# Patient Record
Sex: MALE | Race: WHITE | NOT HISPANIC OR LATINO | Employment: FULL TIME | URBAN - METROPOLITAN AREA
[De-identification: names, ages, dates, MRNs, and addresses within clinical notes are randomized per-mention and may not be internally consistent; named-entity substitution may affect disease eponyms.]

---

## 2020-08-24 ENCOUNTER — OFFICE VISIT (OUTPATIENT)
Dept: PODIATRY | Facility: CLINIC | Age: 20
End: 2020-08-24
Payer: COMMERCIAL

## 2020-08-24 VITALS
SYSTOLIC BLOOD PRESSURE: 143 MMHG | HEIGHT: 72 IN | BODY MASS INDEX: 25.06 KG/M2 | HEART RATE: 71 BPM | DIASTOLIC BLOOD PRESSURE: 77 MMHG | WEIGHT: 185 LBS

## 2020-08-24 DIAGNOSIS — L60.0 INGROWN TOENAIL: Primary | ICD-10-CM

## 2020-08-24 DIAGNOSIS — L85.3 DRY SKIN: ICD-10-CM

## 2020-08-24 DIAGNOSIS — B35.3 TINEA PEDIS OF LEFT FOOT: ICD-10-CM

## 2020-08-24 DIAGNOSIS — M20.61 ACQUIRED DEFORMITY OF RIGHT TOE: ICD-10-CM

## 2020-08-24 DIAGNOSIS — M20.62 ACQUIRED DEFORMITY OF LEFT TOE: ICD-10-CM

## 2020-08-24 PROCEDURE — 99202 OFFICE O/P NEW SF 15 MIN: CPT | Performed by: PODIATRIST

## 2020-08-24 NOTE — PROGRESS NOTES
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  In order to correct these toes surgery is needed  This is not recommended due to paucity of symptoms  Reappoint p r n  No problem-specific Assessment & Plan notes found for this encounter  Diagnoses and all orders for this visit:    Ingrown toenail    Dry skin    Acquired deformity of right toe    Acquired deformity of left toe    Tinea pedis of left foot          Subjective:      Patient ID: Radha Yan is a 22 y o  male  HPI     Patient, a 78-year-old male in good health presents with multiple pedal concerns  Patient relates that he had ingrown toenail pain along the lateral nail border of the right great toe when he made his appointment  This nail has been a problem off and on throughout the years but today he has no pain  A 2nd concern involves callus tissue present on the back of each heel  Patient also relates intermittent scaling and itching between the left 4th and 5th toes  Lastly, patient notes that each 2nd toe is curled when he stands  He has no 2nd toe pain  He relates that this is a genetic issue as his Grandmother has a similar problem  The following portions of the patient's history were reviewed and updated as appropriate: allergies, current medications, past family history, past medical history, past social history, past surgical history and problem list     Review of Systems   Constitutional: Negative  Respiratory: Negative  Cardiovascular: Negative  Gastrointestinal: Negative  Musculoskeletal: Negative  Neurological: Negative  Objective:      /77   Pulse 71   Ht 6' (1 829 m)   Wt 83 9 kg (185 lb)   BMI 25 09 kg/m²          Physical Exam  Constitutional:       Appearance: Normal appearance  Cardiovascular:      Pulses: Normal pulses  Musculoskeletal:         General: Deformity present  Comments: Mallet toe deformity noted 2nd toe bilateral    Skin:     General: Skin is dry  Findings: Lesion present  Comments: Dry callus skin noted posterior lateral aspect each heel  Scaling noted between the left 4th and 5th toenails  Evidence of prior ingrown nail along lateral nail border of the right great toe  No pain with palpation  Neurological:      General: No focal deficit present  Mental Status: He is oriented to person, place, and time

## 2020-10-19 ENCOUNTER — OFFICE VISIT (OUTPATIENT)
Dept: PODIATRY | Facility: CLINIC | Age: 20
End: 2020-10-19
Payer: COMMERCIAL

## 2020-10-19 VITALS — BODY MASS INDEX: 23.86 KG/M2 | WEIGHT: 180 LBS | HEIGHT: 73 IN | TEMPERATURE: 97.8 F

## 2020-10-19 DIAGNOSIS — L60.0 INGROWN TOENAIL: Primary | ICD-10-CM

## 2020-10-19 DIAGNOSIS — M79.674 PAIN IN TOE OF RIGHT FOOT: ICD-10-CM

## 2020-10-19 PROCEDURE — 99212 OFFICE O/P EST SF 10 MIN: CPT | Performed by: PODIATRIST

## 2020-10-19 PROCEDURE — 11750 EXCISION NAIL&NAIL MATRIX: CPT | Performed by: PODIATRIST

## 2020-10-19 RX ORDER — LIDOCAINE HYDROCHLORIDE AND EPINEPHRINE 10; 10 MG/ML; UG/ML
1 INJECTION, SOLUTION INFILTRATION; PERINEURAL ONCE
Status: COMPLETED | OUTPATIENT
Start: 2020-10-19 | End: 2020-10-19

## 2020-10-19 RX ORDER — LIDOCAINE HYDROCHLORIDE 10 MG/ML
1 INJECTION, SOLUTION EPIDURAL; INFILTRATION; INTRACAUDAL; PERINEURAL ONCE
Status: COMPLETED | OUTPATIENT
Start: 2020-10-19 | End: 2020-10-19

## 2020-10-19 RX ADMIN — LIDOCAINE HYDROCHLORIDE 1 ML: 10 INJECTION, SOLUTION EPIDURAL; INFILTRATION; INTRACAUDAL; PERINEURAL at 10:19

## 2020-10-19 RX ADMIN — LIDOCAINE HYDROCHLORIDE AND EPINEPHRINE 1 ML: 10; 10 INJECTION, SOLUTION INFILTRATION; PERINEURAL at 10:20

## 2020-11-03 ENCOUNTER — OFFICE VISIT (OUTPATIENT)
Dept: PODIATRY | Facility: CLINIC | Age: 20
End: 2020-11-03
Payer: COMMERCIAL

## 2020-11-03 VITALS
DIASTOLIC BLOOD PRESSURE: 77 MMHG | HEART RATE: 73 BPM | WEIGHT: 201 LBS | SYSTOLIC BLOOD PRESSURE: 129 MMHG | HEIGHT: 73 IN | BODY MASS INDEX: 26.64 KG/M2

## 2020-11-03 DIAGNOSIS — L60.0 INGROWN TOENAIL: Primary | ICD-10-CM

## 2020-11-03 PROCEDURE — 99212 OFFICE O/P EST SF 10 MIN: CPT | Performed by: PODIATRIST

## 2021-01-13 ENCOUNTER — OFFICE VISIT (OUTPATIENT)
Dept: URGENT CARE | Age: 21
End: 2021-01-13
Payer: COMMERCIAL

## 2021-01-13 VITALS
BODY MASS INDEX: 27.09 KG/M2 | TEMPERATURE: 97.4 F | HEIGHT: 72 IN | OXYGEN SATURATION: 97 % | WEIGHT: 200 LBS | RESPIRATION RATE: 16 BRPM | HEART RATE: 78 BPM

## 2021-01-13 DIAGNOSIS — J02.9 SORE THROAT: Primary | ICD-10-CM

## 2021-01-13 PROCEDURE — 99203 OFFICE O/P NEW LOW 30 MIN: CPT | Performed by: PHYSICIAN ASSISTANT

## 2021-01-13 PROCEDURE — U0005 INFEC AGEN DETEC AMPLI PROBE: HCPCS | Performed by: PHYSICIAN ASSISTANT

## 2021-01-13 PROCEDURE — U0003 INFECTIOUS AGENT DETECTION BY NUCLEIC ACID (DNA OR RNA); SEVERE ACUTE RESPIRATORY SYNDROME CORONAVIRUS 2 (SARS-COV-2) (CORONAVIRUS DISEASE [COVID-19]), AMPLIFIED PROBE TECHNIQUE, MAKING USE OF HIGH THROUGHPUT TECHNOLOGIES AS DESCRIBED BY CMS-2020-01-R: HCPCS | Performed by: PHYSICIAN ASSISTANT

## 2021-01-13 NOTE — PATIENT INSTRUCTIONS

## 2021-01-15 LAB — SARS-COV-2 RNA SPEC QL NAA+PROBE: NOT DETECTED

## 2021-03-23 ENCOUNTER — OFFICE VISIT (OUTPATIENT)
Dept: FAMILY MEDICINE CLINIC | Facility: CLINIC | Age: 21
End: 2021-03-23
Payer: COMMERCIAL

## 2021-03-23 VITALS
RESPIRATION RATE: 16 BRPM | OXYGEN SATURATION: 98 % | BODY MASS INDEX: 26.85 KG/M2 | TEMPERATURE: 98.4 F | HEIGHT: 73 IN | HEART RATE: 90 BPM | SYSTOLIC BLOOD PRESSURE: 132 MMHG | WEIGHT: 202.6 LBS | DIASTOLIC BLOOD PRESSURE: 80 MMHG

## 2021-03-23 DIAGNOSIS — M54.50 CHRONIC BILATERAL LOW BACK PAIN WITHOUT SCIATICA: ICD-10-CM

## 2021-03-23 DIAGNOSIS — B35.9 TINEA: ICD-10-CM

## 2021-03-23 DIAGNOSIS — G89.29 CHRONIC BILATERAL LOW BACK PAIN WITHOUT SCIATICA: ICD-10-CM

## 2021-03-23 DIAGNOSIS — R10.9 ABDOMINAL PAIN, UNSPECIFIED ABDOMINAL LOCATION: Primary | ICD-10-CM

## 2021-03-23 PROCEDURE — 3008F BODY MASS INDEX DOCD: CPT | Performed by: FAMILY MEDICINE

## 2021-03-23 PROCEDURE — 99204 OFFICE O/P NEW MOD 45 MIN: CPT | Performed by: FAMILY MEDICINE

## 2021-03-23 RX ORDER — CLOTRIMAZOLE AND BETAMETHASONE DIPROPIONATE 10; .64 MG/G; MG/G
CREAM TOPICAL 2 TIMES DAILY
Qty: 30 G | Refills: 3 | Status: SHIPPED | OUTPATIENT
Start: 2021-03-23

## 2021-03-23 NOTE — PROGRESS NOTES
FAMILY PRACTICE OFFICE VISIT       NAME: Zeferino Bermeo  AGE: 23 y o  SEX: male       : 2000        MRN: [id number]    DATE: 3/24/2021  TIME: 6:08 AM    Assessment and Plan     Problem List Items Addressed This Visit        Musculoskeletal and Integument    Tinea      Tinea  Patient given prescription for Lotrisone cream to apply twice daily to the affected area  Patient will call if symptoms persist          Relevant Medications    clotrimazole-betamethasone (LOTRISONE) 1-0 05 % cream       Other    Abdominal pain - Primary       Abdominal pain  Etiology unknown at this time  He was recommended to try over-the-counter famotidine 20 mg once daily  He will also obtain blood work as ordered for further evaluation         Relevant Orders    CBC    Comprehensive metabolic panel    TSH, 3rd generation    C-reactive protein    Chronic bilateral low back pain without sciatica      Back pain  Patient will obtain x-ray of lumbar spine for initial evaluation  He may also take over-the-counter Tylenol or Motrin for symptoms should they recur         Relevant Orders    XR spine lumbar minimum 4 views non injury        BMI Counseling: Body mass index is 26 73 kg/m²  The BMI is above normal  Nutrition recommendations include decreasing portion sizes and moderation in carbohydrate intake  Exercise recommendations include exercising 3-5 times per week  Chief Complaint     Chief Complaint   Patient presents with    Physical Exam     josse pains / shakey        History of Present Illness       This is the 1st office visit for patient to establish new PCP  Patient has several complaints that have been present for over a year  He describes vague intermittent abdominal pains bilateral size that occur approximately twice a day and resolved spontaneously  Patient states he normally has 3 bowel movements daily which is not changed from his routine  He denies any nausea or vomiting    He does describe feeling like he has difficulties burping  He does admit to eating his foods rapidly without chewing them thoroughly  He admits to vaping on a daily basis  he also describes low back pain which occurs more so on the weekends when he has a longer shift 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  He has not taken any over-the-counter medications for this condition     patient also describes irritating itchy rash that occurs from his rectal area as to his scrotum especially under warm conditions that make him sweat      Review of Systems   Review of Systems   Constitutional: Negative  HENT: Negative  Respiratory: Negative  Cardiovascular: Negative  Gastrointestinal: Positive for abdominal pain  Genitourinary: Negative  Musculoskeletal: Positive for back pain  Negative for arthralgias and gait problem  Skin: Positive for rash  Neurological: Negative  Psychiatric/Behavioral: Negative  Active Problem List     Patient Active Problem List   Diagnosis    Abdominal pain    Tinea    Chronic bilateral low back pain without sciatica       Past Medical History:  History reviewed  No pertinent past medical history  Past Surgical History:  History reviewed  No pertinent surgical history  Family History:  History reviewed  No pertinent family history      Social History:  Social History     Socioeconomic History    Marital status: Single     Spouse name: Not on file    Number of children: Not on file    Years of education: Not on file    Highest education level: Not on file   Occupational History    Not on file   Social Needs    Financial resource strain: Not on file    Food insecurity     Worry: Not on file     Inability: Not on file    Transportation needs     Medical: Not on file     Non-medical: Not on file   Tobacco Use    Smoking status: Current Every Day Smoker     Types: Cigarettes    Smokeless tobacco: Never Used   Substance and Sexual Activity    Alcohol use: Never     Frequency: Never    Drug use: Never    Sexual activity: Not on file   Lifestyle    Physical activity     Days per week: Not on file     Minutes per session: Not on file    Stress: Not on file   Relationships    Social connections     Talks on phone: Not on file     Gets together: Not on file     Attends Holiness service: Not on file     Active member of club or organization: Not on file     Attends meetings of clubs or organizations: Not on file     Relationship status: Not on file    Intimate partner violence     Fear of current or ex partner: Not on file     Emotionally abused: Not on file     Physically abused: Not on file     Forced sexual activity: Not on file   Other Topics Concern    Not on file   Social History Narrative    Not on file       Objective     Vitals:    03/23/21 1418   BP: 132/80   Pulse: 90   Resp: 16   Temp: 98 4 °F (36 9 °C)   SpO2: 98%     Wt Readings from Last 3 Encounters:   03/23/21 91 9 kg (202 lb 9 6 oz)   01/13/21 90 7 kg (200 lb)   11/03/20 91 2 kg (201 lb)       Physical Exam  Constitutional:       General: He is not in acute distress  Appearance: Normal appearance  He is not ill-appearing  HENT:      Head: Normocephalic and atraumatic  Eyes:      General:         Right eye: No discharge  Left eye: No discharge  Extraocular Movements: Extraocular movements intact  Conjunctiva/sclera: Conjunctivae normal       Pupils: Pupils are equal, round, and reactive to light  Cardiovascular:      Rate and Rhythm: Normal rate and regular rhythm  Heart sounds: Normal heart sounds  No murmur  Pulmonary:      Effort: Pulmonary effort is normal  No respiratory distress  Breath sounds: Normal breath sounds  No wheezing, rhonchi or rales  Abdominal:      General: Abdomen is flat  Bowel sounds are normal  There is no distension  Palpations: Abdomen is soft  Tenderness: There is no abdominal tenderness   There is no guarding or rebound  Musculoskeletal:      Right lower leg: No edema  Left lower leg: No edema  Comments: Normal range of motion of lumbar spine  Muscle strength 5/5 lower extremities  Negative vertebral tenderness to palpation  Neurological:      General: No focal deficit present  Mental Status: He is alert and oriented to person, place, and time  Mental status is at baseline  Psychiatric:         Mood and Affect: Mood normal          Behavior: Behavior normal          Thought Content: Thought content normal          Judgment: Judgment normal          Pertinent Laboratory/Diagnostic Studies:  No results found for: GLUCOSE, BUN, CREATININE, CALCIUM, NA, K, CO2, CL  No results found for: ALT, AST, GGT, ALKPHOS, BILITOT    No results found for: WBC, HGB, HCT, MCV, PLT    No results found for: TSH    No results found for: CHOL  No results found for: TRIG  No results found for: HDL  No results found for: LDLCALC  No results found for: HGBA1C    Results for orders placed or performed in visit on 01/13/21   Novel Coronavirus (COVID-19), PCR [facility] - Office Collection    Specimen: Nose; Nares   Result Value Ref Range    SARS-CoV-2  Not Detected Not Detected       Orders Placed This Encounter   Procedures    XR spine lumbar minimum 4 views non injury    CBC    Comprehensive metabolic panel    TSH, 3rd generation    C-reactive protein       ALLERGIES:  Allergies   Allergen Reactions    Seasonal Ic [Cholestatin]        Current Medications     Current Outpatient Medications   Medication Sig Dispense Refill    clotrimazole-betamethasone (LOTRISONE) 1-0 05 % cream Apply topically 2 (two) times a day 30 g 3     No current facility-administered medications for this visit            Health Maintenance     Health Maintenance   Topic Date Due    Pneumococcal Vaccine: Pediatrics (0 to 5 Years) and At-Risk Patients (6 to 59 Years) (1 of 1 - PPSV23) Never done    HPV Vaccine (1 - Male 2-dose series) Never done    HIV Screening  Never done    COVID-19 Vaccine (1) Never done    Annual Physical  Never done    DTaP,Tdap,and Td Vaccines (1 - Tdap) Never done    Influenza Vaccine (1) 06/30/2021 (Originally 9/1/2020)    Depression Screening PHQ  01/13/2022    BMI: Followup Plan  03/23/2022    BMI: Adult  03/23/2022    HIB Vaccine  Aged Out    Hepatitis B Vaccine  Aged Out    IPV Vaccine  Aged Out    Hepatitis A Vaccine  Aged Out    Meningococcal ACWY Vaccine  Aged Out     Immunization History   Administered Date(s) Administered    H1N1, All Formulations 11/11/2009    INFLUENZA 77/19/9000       Frank Ceballos MD

## 2021-03-24 NOTE — ASSESSMENT & PLAN NOTE
Abdominal pain  Etiology unknown at this time  He was recommended to try over-the-counter famotidine 20 mg once daily    He will also obtain blood work as ordered for further evaluation

## 2021-03-24 NOTE — ASSESSMENT & PLAN NOTE
Tinea  Patient given prescription for Lotrisone cream to apply twice daily to the affected area    Patient will call if symptoms persist

## 2021-03-24 NOTE — ASSESSMENT & PLAN NOTE
Back pain  Patient will obtain x-ray of lumbar spine for initial evaluation    He may also take over-the-counter Tylenol or Motrin for symptoms should they recur

## 2023-04-24 NOTE — PROGRESS NOTES
[id number] [employer]        NAME: Ruth Ruiz is a 23 y o  male  : 2000    MRN: [id number]  DATE: 2021  TIME: 4:23 PM    Assessment and Plan   Sore throat [J02 9]  1  Sore throat  Novel Coronavirus (COVID-19), PCR [facility] - Office Collection         Patient Instructions       Follow up with PCP in 3-5 days  Proceed to  ER if symptoms worsen  Chief Complaint     Chief Complaint   Patient presents with    Sore Throat     over the weekend to [place] [id number] COVID-19     had an exposure to someone on 1/3         History of Present Illness         Patient presents for COVID testing  He was around someone positive for COVID 2 weeks ago  He did have a sore throat a few days ago but that has resolved  He denies any symptoms currently  Review of Systems   Review of Systems   Constitutional: Negative  HENT: Negative  Respiratory: Negative  Cardiovascular: Negative  Gastrointestinal: Negative  Musculoskeletal: Negative  Neurological: Negative  Psychiatric/Behavioral: Negative  Current Medications     No current outpatient medications on file  Current Allergies     Allergies as of 2021 - Reviewed 2021   Allergen Reaction Noted    Seasonal ic [cholestatin]  2020            The following portions of the patient's history were reviewed and updated as appropriate: allergies, current medications, past family history, past medical history, past social history, past surgical history and problem list      History reviewed  No pertinent past medical history  History reviewed  No pertinent surgical history  No family history on file  Medications have been verified  Objective   Pulse 78   Temp (!) 97 4 °F (36 3 °C)   Resp 16   Ht 6' (1 829 m)   Wt 90 7 kg (200 lb)   SpO2 97%   BMI 27 12 kg/m²        Physical Exam     Physical Exam  Vitals signs and nursing note reviewed     Constitutional:       General: He is not in acute distress  Appearance: Normal appearance  He is not ill-appearing, toxic-appearing or diaphoretic  Cardiovascular:      Rate and Rhythm: Normal rate and regular rhythm  Pulses: Normal pulses  Pulmonary:      Effort: Pulmonary effort is normal       Breath sounds: Normal breath sounds  No wheezing  Skin:     General: Skin is warm and dry  Neurological:      General: No focal deficit present  Mental Status: He is alert and oriented to person, place, and time     Psychiatric:         Mood and Affect: Mood normal          Behavior: Behavior normal  Dr. Pinedo

## 2024-03-27 ENCOUNTER — TELEPHONE (OUTPATIENT)
Dept: FAMILY MEDICINE CLINIC | Facility: CLINIC | Age: 24
End: 2024-03-27

## 2024-03-27 NOTE — LETTER
Bigg Henderson  [address]  [place] [address]      3/27/2024      Dear Bigg,      Records from Insurance indicate that you are listed as a patient of Ant Horner MD with [facility], [facility].     However, it has now been over 3 years since your last appointment on 3/23/2021.      Please contact our office at [phone] to ensure that you remain established with Ant Horner MD by scheduling your Annual Visit.    If you have established with a primary care physician other than the one listed above, please let our office know so that we can update our records. We also suggest calling the number on the back of your insurance card to update this information with your insurance company.    We thank you for choosing [facility] for your healthcare needs.      Sincerely,    [facility]

## 2024-05-16 NOTE — TELEPHONE ENCOUNTER
05/16/24 3:23 PM        The office's request has been received, reviewed, and the patient chart updated. The PCP has successfully been removed with a patient attribution note. This message will now be completed.        Thank you  August Kwok